# Patient Record
Sex: FEMALE | URBAN - METROPOLITAN AREA
[De-identification: names, ages, dates, MRNs, and addresses within clinical notes are randomized per-mention and may not be internally consistent; named-entity substitution may affect disease eponyms.]

---

## 2017-03-03 ENCOUNTER — APPOINTMENT (RX ONLY)
Dept: URBAN - METROPOLITAN AREA CLINIC 5 | Facility: CLINIC | Age: 52
Setting detail: DERMATOLOGY
End: 2017-03-03

## 2017-03-03 VITALS — WEIGHT: 150 LBS | HEIGHT: 64 IN

## 2017-03-03 DIAGNOSIS — Z41.1 ENCOUNTER FOR COSMETIC SURGERY: ICD-10-CM

## 2017-03-03 PROCEDURE — ? RECOMMENDATIONS

## 2017-03-03 PROCEDURE — ? CONSULTATION - BREAST (COSMETIC)

## 2017-03-03 ASSESSMENT — LOCATION DETAILED DESCRIPTION DERM
LOCATION DETAILED: RIGHT MEDIAL BREAST 4-5:00 REGION
LOCATION DETAILED: LEFT MEDIAL BREAST 8-9:00 REGION

## 2017-03-03 ASSESSMENT — LOCATION SIMPLE DESCRIPTION DERM
LOCATION SIMPLE: LEFT BREAST
LOCATION SIMPLE: RIGHT BREAST

## 2017-03-03 ASSESSMENT — LOCATION ZONE DERM: LOCATION ZONE: TRUNK

## 2017-03-03 NOTE — PROCEDURE: RECOMMENDATIONS
Recommendation Preamble: The following recommendations were made during the visit:
Recommendations (Free Text): 1. Vertical mastopexy augmentation. Silicone 350cc. (2.5h)\\n\\n-must obtain updated mammogram (her next one is due 05/2017)\\n-must obtain clearance from PCP, EKG, and Labs\\n\\n-I advised the patient loose skin lateral to her breasts extending to her back is best resolved with a back lift. However, scars will run very obviously along the back. Liposuction would not achieve the patient's aesthetic goals, it may in fact worsen it.\\n-patient declined to try on other implant sizes.
Detail Level: Zone